# Patient Record
Sex: MALE | Race: WHITE | URBAN - METROPOLITAN AREA
[De-identification: names, ages, dates, MRNs, and addresses within clinical notes are randomized per-mention and may not be internally consistent; named-entity substitution may affect disease eponyms.]

---

## 2017-01-23 ENCOUNTER — HOSPITAL ENCOUNTER (OUTPATIENT)
Dept: CARDIOLOGY | Facility: CLINIC | Age: 27
Discharge: HOME | End: 2017-01-23
Admitting: INTERNAL MEDICINE
Payer: COMMERCIAL

## 2017-01-23 DIAGNOSIS — I42.9 CARDIOMYOPATHY, UNSPECIFIED (H): ICD-10-CM

## 2017-01-23 PROCEDURE — 0296T ZIO PATCH HOLTER: CPT

## 2017-01-23 PROCEDURE — 0298T ZZC EXT ECG > 48HR TO 21 DAY REVIEW AND INTERPRETATN: CPT | Performed by: INTERNAL MEDICINE

## 2017-02-10 ENCOUNTER — HOSPITAL ENCOUNTER (OUTPATIENT)
Dept: CARDIOLOGY | Facility: CLINIC | Age: 27
Discharge: HOME | End: 2017-02-10
Attending: INTERNAL MEDICINE | Admitting: INTERNAL MEDICINE
Payer: COMMERCIAL

## 2017-02-10 DIAGNOSIS — I42.9 CARDIOMYOPATHY, UNSPECIFIED (H): ICD-10-CM

## 2017-02-10 PROCEDURE — 93306 TTE W/DOPPLER COMPLETE: CPT | Mod: 26 | Performed by: INTERNAL MEDICINE

## 2017-02-10 PROCEDURE — 93306 TTE W/DOPPLER COMPLETE: CPT

## 2017-02-27 ENCOUNTER — OFFICE VISIT (OUTPATIENT)
Dept: CARDIOLOGY | Facility: CLINIC | Age: 27
End: 2017-02-27
Attending: INTERNAL MEDICINE
Payer: COMMERCIAL

## 2017-02-27 VITALS
DIASTOLIC BLOOD PRESSURE: 64 MMHG | HEART RATE: 60 BPM | WEIGHT: 149 LBS | HEIGHT: 64 IN | BODY MASS INDEX: 25.44 KG/M2 | SYSTOLIC BLOOD PRESSURE: 104 MMHG

## 2017-02-27 DIAGNOSIS — R07.9 CHEST PAIN, UNSPECIFIED TYPE: Primary | ICD-10-CM

## 2017-02-27 DIAGNOSIS — I42.9 CARDIOMYOPATHY, UNSPECIFIED (H): ICD-10-CM

## 2017-02-27 PROCEDURE — 99213 OFFICE O/P EST LOW 20 MIN: CPT | Mod: 25 | Performed by: INTERNAL MEDICINE

## 2017-02-27 PROCEDURE — 93000 ELECTROCARDIOGRAM COMPLETE: CPT | Performed by: INTERNAL MEDICINE

## 2017-02-27 NOTE — PROGRESS NOTES
"HPI and Plan:   See dictation    Orders Placed This Encounter   Procedures     EKG 12-lead complete w/read - Clinics (performed today)       No orders of the defined types were placed in this encounter.      There are no discontinued medications.      Encounter Diagnoses   Name Primary?     Cardiomyopathy, unspecified (H)      Chest pain, unspecified type Yes       CURRENT MEDICATIONS:  No current outpatient prescriptions on file.       ALLERGIES   No Known Allergies    PAST MEDICAL HISTORY:  Past Medical History   Diagnosis Date     Hypertrophic cardiomyopathy (H)        PAST SURGICAL HISTORY:  No past surgical history on file.    FAMILY HISTORY:  Family History   Problem Relation Age of Onset     Family History Negative Father      Family History Negative Mother      Coronary Artery Disease Paternal Grandfather      in late 50s       SOCIAL HISTORY:  Social History     Social History     Marital status: Single     Spouse name: N/A     Number of children: N/A     Years of education: N/A     Social History Main Topics     Smoking status: Never Smoker     Smokeless tobacco: Never Used     Alcohol use 0.0 oz/week     0 Standard drinks or equivalent per week      Comment: moderate      Drug use: No     Sexual activity: Yes     Partners: Female     Other Topics Concern     Special Diet No     Exercise No     active job     Social History Narrative       Review of Systems:  Skin:  Negative       Eyes:  Negative      ENT:  Negative      Respiratory:  Negative       Cardiovascular:    Positive for;chest pain ( random minor discomfort) varies, comes and goes   Gastroenterology: Negative      Genitourinary:  Negative      Musculoskeletal:  Positive for back pain occ   Neurologic:  Negative      Psychiatric:  Negative      Heme/Lymph/Imm:  Negative      Endocrine:  Negative        Physical Exam:  Vitals: /64  Pulse 60  Ht 1.626 m (5' 4\")  Wt 67.6 kg (149 lb)  BMI 25.58 kg/m2    Constitutional:           Skin:       "     Head:           Eyes:           ENT:           Neck:           Chest:             Cardiac:                    Abdomen:           Vascular:                                          Extremities and Back:                 Neurological:                 CC  [facility]  No address on file

## 2017-02-27 NOTE — LETTER
2/27/2017    [facility]    RE: Oscar Singer       Dear Colleague,    I had the pleasure of seeing Oscar Singer in the [facility].    I saw Mr. Singer for followup of hypertrophic cardiomyopathy.  He is a 26-year-old white male who was found to have severe LV hypertrophy per EKG.  He was referred to me for evaluation of candidacy for possible ICD implant.  I saw him on 12/13/2016.  After that visit, he has had a 2-week Zio Patch monitor which did not detect any ventricular tachycardia.  The cardiac MRI does support the diagnosis of hypertrophic cardiomyopathy.  The thickness of the ventricle is, however, moderate at 1.6-1.5 cm.  There was no evidence of outflow tract obstruction.  The patient had no history of syncope or near-syncope.  There is no family history of sudden cardiac death.  At the present time he feels well with no complaints.      PHYSICAL EXAMINATION:  Blood pressure was 104/64, heart rate 60 beats per minute, body weight 149 pounds.  The physical examination otherwise showed no remarkable changes.     No outpatient encounter prescriptions on file as of 2/27/2017.     No facility-administered encounter medications on file as of 2/27/2017.       ASSESSMENT AND RECOMMENDATIONS:  Mr. Singer is a 26-year-old white male with hypertrophic cardiomyopathy.  The LV is moderately hypertrophied without outflow tract obstruction.  The cardiac MRI showed no evidence of myocardial scar.  The Zio Patch monitor did not detect ventricular tachycardia.  The patient has no indication for ICD implantation at the present time.  Due to the low blood pressure and relatively slow heart rate, he is not a candidate for beta blocker therapy.  Therefore, he will not receive any prescription medications for hypertrophic cardiomyopathy.  His only restriction is to avoid strenuous physical activities.  He can return for regular Cardiology followup with Dr. Neff.  If there is any  syncope, near syncope, or changes of the ventricular thickness, the issue of ICD implantation will be reconsidered at that point.                Again, thank you for allowing me to participate in the care of your patient.      Sincerely,    Marcio Beverly MD     [facility]

## 2017-02-27 NOTE — MR AVS SNAPSHOT
"              After Visit Summary   2/27/2017    Oscar Singer    MRN: [id number]           Patient Information     Date Of Birth          1990        Visit Information        Provider Department      2/27/2017 1:15 PM Marcio Beverly MD [facility] AT [place]        Today's Diagnoses     Chest pain, unspecified type    -  1    Cardiomyopathy, unspecified (H)           Follow-ups after your visit        Who to contact     If you have questions or need follow up information about today's clinic visit or your schedule please contact [facility] directly at [phone].  Normal or non-critical lab and imaging results will be communicated to you by Sonop[employer], letter or phone within 4 business days after the clinic has received the results. If you do not hear from us within 7 days, please contact the clinic through [employer] or phone. If you have a critical or abnormal lab result, we will notify you by phone as soon as possible.  Submit refill requests through [employer] or call your pharmacy and they will forward the refill request to us. Please allow 3 business days for your refill to be completed.          Additional Information About Your Visit        MyChart Information     [employer] gives you secure access to your electronic health record. If you see a primary care provider, you can also send messages to your care team and make appointments. If you have questions, please call your primary care clinic.  If you do not have a primary care provider, please call [phone] and they will assist you.        Care EveryWhere ID     This is your Care EveryWhere ID. This could be used by other organizations to access your [place] medical records  [id number]        Your Vitals Were     Pulse Height BMI (Body Mass Index)             60 1.626 m (5' 4\") 25.58 kg/m2          Blood Pressure from Last 3 Encounters:   No data found for BP    Weight from Last 3 " Encounters:   No data found for Wt              Today, you had the following     No orders found for display       Primary Care Provider Office Phone #    [facility] [phone]       [facility] [address]  [facility] [address]        Thank you!     Thank you for choosing [facility] [place]  for your care. Our goal is always to provide you with excellent care. Hearing back from our patients is one way we can continue to improve our services. Please take a few minutes to complete the written survey that you may receive in the mail after your visit with us. Thank you!             Your Updated Medication List - Protect others around you: Learn how to safely use, store and throw away your medicines at www.disposemymeds.org.      Notice  As of 2/27/2017 11:59 PM    You have not been prescribed any medications.

## 2017-02-28 NOTE — PROGRESS NOTES
HISTORY OF PRESENT ILLNESS:  I saw Mr. Singer for followup of hypertrophic cardiomyopathy.  He is a 26-year-old white male who was found to have severe LV hypertrophy per EKG.  He was referred to me for evaluation of candidacy for possible ICD implant.  I saw him on 12/13/2016.  After that visit, he has had a 2-week Zio Patch monitor which did not detect any ventricular tachycardia.  The cardiac MRI does support the diagnosis of hypertrophic cardiomyopathy.  The thickness of the ventricle is, however, moderate at 1.6-1.5 cm.  There was no evidence of outflow tract obstruction.  The patient had no history of syncope or near-syncope.  There is no family history of sudden cardiac death.  At the present time he feels well with no complaints.      PHYSICAL EXAMINATION:  Blood pressure was 104/64, heart rate 60 beats per minute, body weight 149 pounds.  The physical examination otherwise showed no remarkable changes.      ASSESSMENT AND RECOMMENDATIONS:  Mr. Singer is a 26-year-old white male with hypertrophic cardiomyopathy.  The LV is moderately hypertrophied without outflow tract obstruction.  The cardiac MRI showed no evidence of myocardial scar.  The Zio Patch monitor did not detect ventricular tachycardia.  The patient has no indication for ICD implantation at the present time.  Due to the low blood pressure and relatively slow heart rate, he is not a candidate for beta blocker therapy.  Therefore, he will not receive any prescription medications for hypertrophic cardiomyopathy.  His only restriction is to avoid strenuous physical activities.  He can return for regular Cardiology followup with Dr. Neff.  If there is any syncope, near syncope, or changes of the ventricular thickness, the issue of ICD implantation will be reconsidered at that point.         DOLLY ALONSO MD             D: 02/27/2017 13:32   T: 02/27/2017 21:00   MT: CARMEN      Name:     NEAL SINGER   MRN:      [id number]        Account:       [id number]   :      1990           Service Date: 2017      Document: [id number]

## 2018-05-09 ENCOUNTER — OFFICE VISIT (OUTPATIENT)
Dept: CARDIOLOGY | Facility: CLINIC | Age: 28
End: 2018-05-09
Payer: COMMERCIAL

## 2018-05-09 VITALS
HEART RATE: 64 BPM | DIASTOLIC BLOOD PRESSURE: 76 MMHG | WEIGHT: 148.3 LBS | SYSTOLIC BLOOD PRESSURE: 132 MMHG | HEIGHT: 64 IN | BODY MASS INDEX: 25.32 KG/M2

## 2018-05-09 DIAGNOSIS — I51.7 CARDIOMEGALY: ICD-10-CM

## 2018-05-09 DIAGNOSIS — I42.2 HYPERTROPHIC CARDIOMYOPATHY (H): Primary | ICD-10-CM

## 2018-05-09 PROCEDURE — 99204 OFFICE O/P NEW MOD 45 MIN: CPT | Performed by: INTERNAL MEDICINE

## 2018-05-09 NOTE — LETTER
5/9/2018    Warren Saab MD  [address] [facility] [address]    RE: Oscar Singer       Dear Colleague,    I had the pleasure of seeing Oscar Singer in the [facility].    Mr. Singer is a 26-year-old white male who has been diagnosed to have hypertrophic cardiomyopathy.  The current imaging information suggested moderate LV hypertrophy without obstruction.  So far we do not have any evidence of ventricular tachyarrhythmia.  There is no family history of sudden cardiac death.  The patient denies history of past syncope or near-syncope.  The thickness of the left ventricular wall does not meet criteria for ICD implantation.  For further evaluation, I have recommended a 2-week Zio Patch monitoring to look for any possibility of nonsustained VT.  ICD may be considered if he is found to have nonsustained VT.  Otherwise, I would recommend observation with the precaution to avoid strenuous exercise.  I plan to see him to review the results of Zio Patch monitor in about 1 month.       HPI and Plan:   See dictation        No orders of the defined types were placed in this encounter.    No orders of the defined types were placed in this encounter.    There are no discontinued medications.      Encounter Diagnosis   Name Primary?     Hypertrophic cardiomyopathy (H) Yes       CURRENT MEDICATIONS:  No current outpatient prescriptions on file.       ALLERGIES   No Known Allergies    PAST MEDICAL HISTORY:  Past Medical History:   Diagnosis Date     Hypertrophic cardiomyopathy (H)        PAST SURGICAL HISTORY:  History reviewed. No pertinent surgical history.    FAMILY HISTORY:  Family History   Problem Relation Age of Onset     Family History Negative Father      Family History Negative Mother      Coronary Artery Disease Paternal Grandfather      in late 50s       SOCIAL HISTORY:  Social History     Social History     Marital status: Single     Spouse name: N/A     Number of  "children: N/A     Years of education: N/A     Social History Main Topics     Smoking status: Never Smoker     Smokeless tobacco: Never Used     Alcohol use 0.0 oz/week     0 Standard drinks or equivalent per week      Comment: 2 beers a week     Drug use: No     Sexual activity: Yes     Partners: Female     Other Topics Concern     Special Diet No     Exercise No     active job     Social History Narrative         Review of Systems:  Skin:  Negative       Eyes:  Positive for contacts    ENT:  Negative      Respiratory:  Negative       Cardiovascular:  Negative      Gastroenterology: Negative      Genitourinary:  Negative      Musculoskeletal:  Negative      Neurologic:  Negative      Psychiatric:  Negative      Heme/Lymph/Imm:  Negative      Endocrine:  Negative        Physical Exam:  Vitals: /76  Pulse 64  Ht 1.626 m (5' 4\")  Wt 67.3 kg (148 lb 4.8 oz)  BMI 25.46 kg/m2    Constitutional:           Skin:             Head:           Eyes:           Lymph:      ENT:           Neck:           Respiratory:            Cardiac:                                                           GI:           Extremities and Muscular Skeletal:                 Neurological:           Psych:           Recent Lab Results:  LIPID RESULTS:  No results found for: CHOL, HDL, LDL, TRIG, CHOLHDLRATIO    LIVER ENZYME RESULTS:  No results found for: AST, ALT    CBC RESULTS:  Lab Results   Component Value Date    WBC 10.6 05/04/2016    RBC 5.35 05/04/2016    HGB 15.8 05/04/2016    HCT 45.4 05/04/2016    MCV 85 05/04/2016    MCH 29.5 05/04/2016    MCHC 34.8 05/04/2016    RDW 11.8 05/04/2016     05/04/2016       BMP RESULTS:  Lab Results   Component Value Date     05/04/2016    POTASSIUM 3.6 05/04/2016    CHLORIDE 107 05/04/2016    CO2 26 05/04/2016    ANIONGAP 6 05/04/2016    GLC 97 05/04/2016    BUN 15 05/04/2016    CR 0.87 05/04/2016    GFRESTIMATED >90  Non  GFR Calc   05/04/2016    GFRESTBLACK " >90   GFR Calc   05/04/2016    JANAY 8.9 05/04/2016        A1C RESULTS:  No results found for: A1C    INR RESULTS:  Lab Results   Component Value Date    INR 1.06 05/04/2016    INR 0.99 05/03/2016           CC  Warren Saab MD  [address] [place] [address]                      Thank you for allowing me to participate in the care of your patient.      Sincerely,     ELROY SOLOMON MD     [facility]    cc:   Warren Saab MD  [address] [place] [id number]

## 2018-05-09 NOTE — LETTER
5/9/2018      Warren Saab MD  [address] [facility] [address]      RE: Oscar Singer       Dear Colleague,    I had the pleasure of seeing Oscar Singer in the [facility].    Service Date: 05/09/2018      HISTORY OF PRESENT ILLNESS:  Oscar Singer is 27.  I saw him a couple of years ago because of concern that he might have LVH versus hypertrophic cardiomyopathy.  He was seen both by myself and by Dr. Beverly of the EP Department.  It was our impression that he might have a mild variant of hypertrophic cardiomyopathy.  Echos have shown increased septal thickness without any evidence of LV outflow tract obstruction.  Oscar had no evidence of atrial or ventricular arrhythmias.  His septal thickness was variably measured at 1.5-1.6 cm, as little as 1.2 cm.  His heart was normal otherwise.      An MRI done and dated 11/14/2016 showed an EF of 56%.  There was moderate septal thickening as noted above.  The posterior wall was 12 mm, and there was a suggestion of possible hypertrophic cardiomyopathy.      Accordingly, he has garnered that label, albeit he has been asymptomatic.  In the past 2 years he has not had any change in his exercise tolerance.  No palpitations, dizziness, syncope, shortness of breath, orthopnea or lower leg edema.      He works full-time.  He is single, lives in an apartment, drives a car, exercises 3-4 times a week doing light repetitious weightlifting without power lifting and aerobic for half an hour, perhaps 9-10 minute miles.        He is single.  He does not drink or smoke.  He does exercise.      REVIEW OF SYSTEMS:  As outlined in Epic and a 10-point review was negative.      PHYSICAL EXAMINATION:   GENERAL:  Trim adult male.   VITAL SIGNS:  Blood pressure 130/70, heart rate 64 beats a minute.  He weighs 148 pounds, which is stable for him.   NECK:  He had no neck vein distention or bruit.  Carotid upstroke normal.   HEART:  Regular without  gallop or murmur.   LUNGS:  Clear to auscultation.   ABDOMEN:  Soft without organomegaly.  Abdomen is flat.   EXTREMITIES:  Free of edema with +2 pedal pulses.      Neal Singer is 27.  He has a component of LVH and mild asymmetric septal hypertrophy.  A possible diagnosis of a mild hypertrophic cardiomyopathy variant is considered.  He does not require any medications at this time.  He is asymptomatic.  We discussed doing light repetitious weightlifting rather than power lifting, and he understood.      I ordered an echo to continue to monitor his LV size, thickness and function.  Other than that, I do not believe he needs any further cardiac evaluation at this time.  I will ask to see him in 2 years.  We will follow him accordingly.  I think he can live a relatively normal life, and he understands my recommendation that he not push himself vigorously or violently, and he has been very mature about that and said that is not a problem.  He exercises comfortably gently and does not push himself.      IMPRESSION:   1.  Possible mild variant of hypertrophic cardiomyopathy.   2.  Asymptomatic, as above.      PLAN:  As above.  Echo now.  Follow up in 2 years.  As time goes by, we may actually be able to minimize or eliminate the diagnosis, but for the time being, he carries the label of mild hypertrophic cardiomyopathy without obstruction or symptoms.         ELROY SOLOMON MD, [facility]       D: 2018   T: 2018   MT: CARMEN      Name:     NEAL SINGER   MRN:      [id number]        Account:      [id number]   :      1990           Service Date: 2018      Document: [id number]      No outpatient encounter prescriptions on file as of 2018.     No facility-administered encounter medications on file as of 2018.      Again, thank you for allowing me to participate in the care of your patient.      Sincerely,    ELROY SOLOMON MD     [facility]

## 2018-05-09 NOTE — PROGRESS NOTES
Mr. Singer is a 26-year-old white male who has been diagnosed to have hypertrophic cardiomyopathy.  The current imaging information suggested moderate LV hypertrophy without obstruction.  So far we do not have any evidence of ventricular tachyarrhythmia.  There is no family history of sudden cardiac death.  The patient denies history of past syncope or near-syncope.  The thickness of the left ventricular wall does not meet criteria for ICD implantation.  For further evaluation, I have recommended a 2-week Zio Patch monitoring to look for any possibility of nonsustained VT.  ICD may be considered if he is found to have nonsustained VT.  Otherwise, I would recommend observation with the precaution to avoid strenuous exercise.  I plan to see him to review the results of Zio Patch monitor in about 1 month.       HPI and Plan:   See dictation        No orders of the defined types were placed in this encounter.    No orders of the defined types were placed in this encounter.    There are no discontinued medications.      Encounter Diagnosis   Name Primary?     Hypertrophic cardiomyopathy (H) Yes       CURRENT MEDICATIONS:  No current outpatient prescriptions on file.       ALLERGIES   No Known Allergies    PAST MEDICAL HISTORY:  Past Medical History:   Diagnosis Date     Hypertrophic cardiomyopathy (H)        PAST SURGICAL HISTORY:  History reviewed. No pertinent surgical history.    FAMILY HISTORY:  Family History   Problem Relation Age of Onset     Family History Negative Father      Family History Negative Mother      Coronary Artery Disease Paternal Grandfather      in late 50s       SOCIAL HISTORY:  Social History     Social History     Marital status: Single     Spouse name: N/A     Number of children: N/A     Years of education: N/A     Social History Main Topics     Smoking status: Never Smoker     Smokeless tobacco: Never Used     Alcohol use 0.0 oz/week     0 Standard drinks or equivalent per week       "Comment: 2 beers a week     Drug use: No     Sexual activity: Yes     Partners: Female     Other Topics Concern     Special Diet No     Exercise No     active job     Social History Narrative         Review of Systems:  Skin:  Negative       Eyes:  Positive for contacts    ENT:  Negative      Respiratory:  Negative       Cardiovascular:  Negative      Gastroenterology: Negative      Genitourinary:  Negative      Musculoskeletal:  Negative      Neurologic:  Negative      Psychiatric:  Negative      Heme/Lymph/Imm:  Negative      Endocrine:  Negative        Physical Exam:  Vitals: /76  Pulse 64  Ht 1.626 m (5' 4\")  Wt 67.3 kg (148 lb 4.8 oz)  BMI 25.46 kg/m2    Constitutional:           Skin:             Head:           Eyes:           Lymph:      ENT:           Neck:           Respiratory:            Cardiac:                                                           GI:           Extremities and Muscular Skeletal:                 Neurological:           Psych:           Recent Lab Results:  LIPID RESULTS:  No results found for: CHOL, HDL, LDL, TRIG, CHOLHDLRATIO    LIVER ENZYME RESULTS:  No results found for: AST, ALT    CBC RESULTS:  Lab Results   Component Value Date    WBC 10.6 05/04/2016    RBC 5.35 05/04/2016    HGB 15.8 05/04/2016    HCT 45.4 05/04/2016    MCV 85 05/04/2016    MCH 29.5 05/04/2016    MCHC 34.8 05/04/2016    RDW 11.8 05/04/2016     05/04/2016       BMP RESULTS:  Lab Results   Component Value Date     05/04/2016    POTASSIUM 3.6 05/04/2016    CHLORIDE 107 05/04/2016    CO2 26 05/04/2016    ANIONGAP 6 05/04/2016    GLC 97 05/04/2016    BUN 15 05/04/2016    CR 0.87 05/04/2016    GFRESTIMATED >90  Non  GFR Calc   05/04/2016    GFRESTBLACK >90   GFR Calc   05/04/2016    JANAY 8.9 05/04/2016        A1C RESULTS:  No results found for: A1C    INR RESULTS:  Lab Results   Component Value Date    INR 1.06 05/04/2016    INR 0.99 05/03/2016 "           CC  Warren Saab MD  [address] [place] [address]

## 2018-05-10 NOTE — PROGRESS NOTES
Service Date: 05/09/2018      HISTORY OF PRESENT ILLNESS:  Oscar Singer is 27.  I saw him a couple of years ago because of concern that he might have LVH versus hypertrophic cardiomyopathy.  He was seen both by myself and by Dr. Beverly of the EP Department.  It was our impression that he might have a mild variant of hypertrophic cardiomyopathy.  Echos have shown increased septal thickness without any evidence of LV outflow tract obstruction.  Oscar had no evidence of atrial or ventricular arrhythmias.  His septal thickness was variably measured at 1.5-1.6 cm, as little as 1.2 cm.  His heart was normal otherwise.      An MRI done and dated 11/14/2016 showed an EF of 56%.  There was moderate septal thickening as noted above.  The posterior wall was 12 mm, and there was a suggestion of possible hypertrophic cardiomyopathy.      Accordingly, he has garnered that label, albeit he has been asymptomatic.  In the past 2 years he has not had any change in his exercise tolerance.  No palpitations, dizziness, syncope, shortness of breath, orthopnea or lower leg edema.      He works full-time.  He is single, lives in an apartment, drives a car, exercises 3-4 times a week doing light repetitious weightlifting without power lifting and aerobic for half an hour, perhaps 9-10 minute miles.        He is single.  He does not drink or smoke.  He does exercise.      REVIEW OF SYSTEMS:  As outlined in Epic and a 10-point review was negative.      PHYSICAL EXAMINATION:   GENERAL:  Trim adult male.   VITAL SIGNS:  Blood pressure 130/70, heart rate 64 beats a minute.  He weighs 148 pounds, which is stable for him.   NECK:  He had no neck vein distention or bruit.  Carotid upstroke normal.   HEART:  Regular without gallop or murmur.   LUNGS:  Clear to auscultation.   ABDOMEN:  Soft without organomegaly.  Abdomen is flat.   EXTREMITIES:  Free of edema with +2 pedal pulses.      Oscar Singer is 27.  He has a component of LVH and mild asymmetric  septal hypertrophy.  A possible diagnosis of a mild hypertrophic cardiomyopathy variant is considered.  He does not require any medications at this time.  He is asymptomatic.  We discussed doing light repetitious weightlifting rather than power lifting, and he understood.      I ordered an echo to continue to monitor his LV size, thickness and function.  Other than that, I do not believe he needs any further cardiac evaluation at this time.  I will ask to see him in 2 years.  We will follow him accordingly.  I think he can live a relatively normal life, and he understands my recommendation that he not push himself vigorously or violently, and he has been very mature about that and said that is not a problem.  He exercises comfortably gently and does not push himself.      IMPRESSION:   1.  Possible mild variant of hypertrophic cardiomyopathy.   2.  Asymptomatic, as above.      PLAN:  As above.  Echo now.  Follow up in 2 years.  As time goes by, we may actually be able to minimize or eliminate the diagnosis, but for the time being, he carries the label of mild hypertrophic cardiomyopathy without obstruction or symptoms.         ELROY SOLOMON MD, FACC             D: 2018   T: 2018   MT: CARMEN      Name:     NEAL RAMIREZ   MRN:      [id number]        Account:      [id number]   :      1990           Service Date: 2018      Document: [id number]

## 2018-05-25 ENCOUNTER — TELEPHONE (OUTPATIENT)
Dept: CARDIOLOGY | Facility: CLINIC | Age: 28
End: 2018-05-25

## 2018-05-25 ENCOUNTER — HOSPITAL ENCOUNTER (OUTPATIENT)
Dept: CARDIOLOGY | Facility: CLINIC | Age: 28
Discharge: HOME | End: 2018-05-25
Attending: INTERNAL MEDICINE | Admitting: INTERNAL MEDICINE
Payer: COMMERCIAL

## 2018-05-25 DIAGNOSIS — I42.2 HYPERTROPHIC CARDIOMYOPATHY (H): ICD-10-CM

## 2018-05-25 PROCEDURE — 93306 TTE W/DOPPLER COMPLETE: CPT | Mod: 26 | Performed by: INTERNAL MEDICINE

## 2018-05-25 PROCEDURE — 93306 TTE W/DOPPLER COMPLETE: CPT

## 2018-05-25 NOTE — TELEPHONE ENCOUNTER
5-9-2018 OV with Dr. Neff  I ordered an echo to continue to monitor his LV size, thickness and function.  Other than that, I do not believe he needs any further cardiac evaluation at this time.  I will ask to see him in 2 years.  We will follow him accordingly.  I think he can live a relatively normal life, and he understands my recommendation that he not push himself vigorously or violently, and he has been very mature about that and said that is not a problem.  He exercises comfortably gently and does not push himself.       Echo Results:  Interpretation Summary     Left ventricular systolic function is normal.  The visual ejection fraction is estimated at 65-70%.  Left ventricular hypertrophy: asymmetric with no LVOT obstruction  [place] is foreshortened and cannot exclude apical hypertrophy.Can consider  Cardiac MRI if clinically appropriate.  The study was technically adequate. Compared to the prior study dated 2017,  there have been no changes.  _____________________________________________________________________________

## 2019-03-25 ENCOUNTER — OFFICE VISIT (OUTPATIENT)
Dept: UROLOGY | Facility: CLINIC | Age: 29
End: 2019-03-25
Payer: COMMERCIAL

## 2019-03-25 VITALS — DIASTOLIC BLOOD PRESSURE: 70 MMHG | RESPIRATION RATE: 16 BRPM | SYSTOLIC BLOOD PRESSURE: 126 MMHG | HEART RATE: 72 BPM

## 2019-03-25 DIAGNOSIS — R35.0 URINARY FREQUENCY: Primary | ICD-10-CM

## 2019-03-25 LAB
ALBUMIN UR-MCNC: NEGATIVE MG/DL
APPEARANCE UR: CLEAR
BILIRUB UR QL STRIP: NEGATIVE
COLOR UR AUTO: YELLOW
GLUCOSE UR STRIP-MCNC: NEGATIVE MG/DL
HGB UR QL STRIP: NEGATIVE
KETONES UR STRIP-MCNC: NEGATIVE MG/DL
LEUKOCYTE ESTERASE UR QL STRIP: NEGATIVE
NITRATE UR QL: NEGATIVE
PH UR STRIP: 7.5 PH (ref 5–7)
SOURCE: ABNORMAL
SP GR UR STRIP: 1.01 (ref 1–1.03)
UROBILINOGEN UR STRIP-ACNC: 0.2 EU/DL (ref 0.2–1)

## 2019-03-25 PROCEDURE — 51798 US URINE CAPACITY MEASURE: CPT | Performed by: UROLOGY

## 2019-03-25 PROCEDURE — 81003 URINALYSIS AUTO W/O SCOPE: CPT | Performed by: UROLOGY

## 2019-03-25 PROCEDURE — 99203 OFFICE O/P NEW LOW 30 MIN: CPT | Mod: 25 | Performed by: UROLOGY

## 2019-03-25 RX ORDER — OXYBUTYNIN CHLORIDE 5 MG/1
5 TABLET, EXTENDED RELEASE ORAL AT BEDTIME
Qty: 30 TABLET | Refills: 1 | Status: SHIPPED | OUTPATIENT
Start: 2019-03-25 | End: 2019-04-26

## 2019-03-25 NOTE — PROGRESS NOTES
S: Oscar Singer is a pleasant  28 year old male who was seen for a consult with regard to patient's urinary complaints.  Patient complains of Nocturia x 2 and Frequency.   Symptoms have been on going for one years(s).  Seems to be stable over time.  He denies any dysuria or hematuria.  He has no obstructive voiding symptoms.  He was seen in the ER last year with negative work up.  He had neg STDs along with neg CT scan stone protocol.      Current Outpatient Medications   Medication Sig Dispense Refill     oxybutynin ER (DITROPAN-XL) 5 MG 24 hr tablet Take 1 tablet (5 mg) by mouth At Bedtime 30 tablet 1     No Known Allergies  Past Medical History:   Diagnosis Date     Hypertrophic cardiomyopathy (H)      No past surgical history on file.   Family History   Problem Relation Age of Onset     Family History Negative Father      Family History Negative Mother      Coronary Artery Disease Paternal Grandfather         in late 50s     He does not have a family history of prostate cancer.  Social History     Socioeconomic History     Marital status: Single     Spouse name: None     Number of children: None     Years of education: None     Highest education level: None   Occupational History     None   Social Needs     Financial resource strain: None     Food insecurity:     Worry: None     Inability: None     Transportation needs:     Medical: None     Non-medical: None   Tobacco Use     Smoking status: Never Smoker     Smokeless tobacco: Never Used   Substance and Sexual Activity     Alcohol use: Yes     Alcohol/week: 0.0 oz     Comment: 2 beers a week     Drug use: No     Sexual activity: Yes     Partners: Female   Lifestyle     Physical activity:     Days per week: None     Minutes per session: None     Stress: None   Relationships     Social connections:     Talks on phone: None     Gets together: None     Attends [employer] service: None     Active member of club or organization: None     Attends meetings of clubs or  organizations: None     Relationship status: None     Intimate partner violence:     Fear of current or ex partner: None     Emotionally abused: None     Physically abused: None     Forced sexual activity: None   Other Topics Concern     Parent/sibling w/ CABG, MI or angioplasty before 65F 55M? Not Asked      Service Not Asked     Blood Transfusions Not Asked     Caffeine Concern Not Asked     Occupational Exposure Not Asked     Hobby Hazards Not Asked     Sleep Concern Not Asked     Stress Concern Not Asked     Weight Concern Not Asked     Special Diet No     Back Care Not Asked     Exercise No     Comment: active job     Bike Helmet Not Asked     Seat Belt Not Asked     Self-Exams Not Asked   Social History Narrative     None        REVIEW OF SYSTEMS  =================  C: NEGATIVE for fever, chills, change in weight  I: NEGATIVE for worrisome rashes, moles or lesions  E/M: NEGATIVE for ear, mouth and throat problems  R: NEGATIVE for significant cough or SHORTNESS OF BREATH  CV:  NEGATIVE for chest pain, palpitations or peripheral edema  GI: NEGATIVE for nausea, abdominal pain, heartburn, or change in bowel habits  NEURO: NEGATIVE numbness/weakness  : see HPI  PSYCH: NEGATIVE depression/anxiety  LYmph: no new enlarged lymph nodes  Ortho: no new trauma/movements           O: Exam:/70 (BP Location: Right arm, Patient Position: Chair, Cuff Size: Adult Regular)   Pulse 72   Resp 16    Constitutional: healthy, alert and no distress  Cardiovascular: negative, PMI normal.   Respiratory: negative, no evidence of respiratory distress  Gastrointestinal: Abdomen soft, non-tender. BS normal. No masses, organomegaly  : penis no discharge. Testis no masses.  No scrotal skin lesion.  Prostate 20 gm smooth no nodule.  Musculoskeletal: extremities normal- no gross deformities noted, gait normal and normal muscle tone  Skin: no suspicious lesions or rashes  Neurologic: Alert and oriented  Psychiatric: mentation  appears normal. and affect normal/bright  Hematologic/Lymphatic/Immunologic: normal ant/post cervical, axillary, supraclavicular and inguinal nodes    Assessment/Plan:   (R35.0) Urinary frequency  (primary encounter diagnosis)  Comment: bladder scan zero ml.  UA neg  Plan: symptoms of OAB            Trial of oxybutynin - side effects discussed            See in one month for re-exam.

## 2019-04-26 DIAGNOSIS — R35.0 URINARY FREQUENCY: ICD-10-CM

## 2019-04-26 RX ORDER — OXYBUTYNIN CHLORIDE 5 MG/1
5 TABLET, EXTENDED RELEASE ORAL AT BEDTIME
Qty: 90 TABLET | Refills: 0 | Status: SHIPPED | OUTPATIENT
Start: 2019-04-26

## 2019-04-26 NOTE — TELEPHONE ENCOUNTER
90 say supply sent to preferred pharmacy.   Insurance denied 30 day. Mike Lopez RN     Signed Prescriptions:                        Disp   Refills    oxybutynin ER (DITROPAN-XL) 5 MG 24 hr tab*90 tab*0        Sig: Take 1 tablet (5 mg) by mouth At Bedtime  Authorizing Provider: KANDI GARRETT  Ordering User: MIKE LOPEZ

## 2020-12-13 ENCOUNTER — HEALTH MAINTENANCE LETTER (OUTPATIENT)
Age: 30
End: 2020-12-13

## 2021-09-26 ENCOUNTER — HEALTH MAINTENANCE LETTER (OUTPATIENT)
Age: 31
End: 2021-09-26

## 2022-01-16 ENCOUNTER — HEALTH MAINTENANCE LETTER (OUTPATIENT)
Age: 32
End: 2022-01-16

## 2023-01-14 ENCOUNTER — HEALTH MAINTENANCE LETTER (OUTPATIENT)
Age: 33
End: 2023-01-14

## 2023-04-23 ENCOUNTER — HEALTH MAINTENANCE LETTER (OUTPATIENT)
Age: 33
End: 2023-04-23